# Patient Record
Sex: MALE | Race: ASIAN | NOT HISPANIC OR LATINO | Employment: STUDENT | URBAN - METROPOLITAN AREA
[De-identification: names, ages, dates, MRNs, and addresses within clinical notes are randomized per-mention and may not be internally consistent; named-entity substitution may affect disease eponyms.]

---

## 2017-08-14 ENCOUNTER — OFFICE VISIT (OUTPATIENT)
Dept: FAMILY MEDICINE | Facility: CLINIC | Age: 22
End: 2017-08-14
Payer: COMMERCIAL

## 2017-08-14 VITALS
TEMPERATURE: 97.9 F | HEIGHT: 68 IN | HEART RATE: 77 BPM | BODY MASS INDEX: 18.64 KG/M2 | SYSTOLIC BLOOD PRESSURE: 103 MMHG | WEIGHT: 123 LBS | DIASTOLIC BLOOD PRESSURE: 63 MMHG

## 2017-08-14 DIAGNOSIS — R76.8 HEPATITIS B CORE ANTIBODY POSITIVE: ICD-10-CM

## 2017-08-14 DIAGNOSIS — Z00.00 ENCOUNTER FOR ROUTINE ADULT HEALTH EXAMINATION WITHOUT ABNORMAL FINDINGS: Primary | ICD-10-CM

## 2017-08-14 DIAGNOSIS — R63.6 UNDERWEIGHT: ICD-10-CM

## 2017-08-14 LAB
ALBUMIN SERPL-MCNC: 4.1 G/DL (ref 3.4–5)
ALP SERPL-CCNC: 68 U/L (ref 65–260)
ALT SERPL W P-5'-P-CCNC: 25 U/L (ref 0–50)
ANION GAP SERPL CALCULATED.3IONS-SCNC: 3 MMOL/L (ref 3–14)
AST SERPL W P-5'-P-CCNC: 12 U/L (ref 0–35)
BILIRUB SERPL-MCNC: 0.6 MG/DL (ref 0.2–1.3)
BUN SERPL-MCNC: 17 MG/DL (ref 7–30)
CALCIUM SERPL-MCNC: 8.9 MG/DL (ref 8.5–10.1)
CHLORIDE SERPL-SCNC: 105 MMOL/L (ref 98–110)
CO2 SERPL-SCNC: 29 MMOL/L (ref 20–32)
CREAT SERPL-MCNC: 0.89 MG/DL (ref 0.5–1)
GFR SERPL CREATININE-BSD FRML MDRD: NORMAL ML/MIN/1.7M2
GLUCOSE SERPL-MCNC: 91 MG/DL (ref 70–99)
POTASSIUM SERPL-SCNC: 3.8 MMOL/L (ref 3.4–5.3)
PROT SERPL-MCNC: 7.4 G/DL (ref 6.8–8.8)
SODIUM SERPL-SCNC: 137 MMOL/L (ref 133–144)

## 2017-08-14 PROCEDURE — 99395 PREV VISIT EST AGE 18-39: CPT | Performed by: FAMILY MEDICINE

## 2017-08-14 PROCEDURE — 80053 COMPREHEN METABOLIC PANEL: CPT | Performed by: FAMILY MEDICINE

## 2017-08-14 PROCEDURE — 36415 COLL VENOUS BLD VENIPUNCTURE: CPT | Performed by: FAMILY MEDICINE

## 2017-08-14 NOTE — NURSING NOTE
"Chief Complaint   Patient presents with     Physical       Initial /63  Pulse 77  Temp 97.9  F (36.6  C) (Tympanic)  Ht 5' 7.75\" (1.721 m)  Wt 123 lb (55.8 kg)  BMI 18.84 kg/m2 Estimated body mass index is 18.84 kg/(m^2) as calculated from the following:    Height as of this encounter: 5' 7.75\" (1.721 m).    Weight as of this encounter: 123 lb (55.8 kg).  Medication Reconciliation: complete  Cristela Shah CMA    "

## 2017-08-14 NOTE — PROGRESS NOTES
SUBJECTIVE:   CC: Iggy Melgoza is an 21 year old male who presents for preventative health visit.     Healthy Habits:    Do you get at least three servings of calcium containing foods daily (dairy, green leafy vegetables, etc.)? yes    Amount of exercise or daily activities, outside of work: 5 day(s) per week, lifts weights    Problems taking medications regularly not applicable    Medication side effects: Na    Have you had an eye exam in the past two years? yes    Do you see a dentist twice per year? yes    Do you have sleep apnea, excessive snoring or daytime drowsiness?no      No other concerns    Mother asked about patient's result from [employer] having reactive HBc antibody but negative HBsAg.  Patient's birth mother was a hep B carrier.  Patient's immunization record showed he received Hep B immune globulin at birth, and two doses of hep B vaccine as an infant.    Today's PHQ-2 Score:   PHQ-2 ( 1999 Pfizer) 8/14/2017 6/17/2013   Q1: Little interest or pleasure in doing things 0 0   Q2: Feeling down, depressed or hopeless 0 0   PHQ-2 Score 0 0         Abuse: Current or Past(Physical, Sexual or Emotional)- No  Do you feel safe in your environment - Yes  Social History   Substance Use Topics     Smoking status: Never Smoker     Smokeless tobacco: Never Used     Alcohol use No     The patient does not drink >3 drinks per day nor >7 drinks per week.    Last PSA: No results found for: PSA    Reviewed orders with patient. Reviewed health maintenance and updated orders accordingly - Yes  BP Readings from Last 3 Encounters:   08/14/17 103/63   03/17/15 94/60   11/20/14 110/58    Wt Readings from Last 3 Encounters:   08/14/17 123 lb (55.8 kg) (5 %)*   03/17/15 125 lb (56.7 kg) (17 %)*   12/09/14 122 lb 8 oz (55.6 kg) (16 %)*     * Growth percentiles are based on CDC 2-20 Years data.                  Patient Active Problem List   Diagnosis     Nocturnal enuresis     Acne     Episodic mood disorder (H)     Past  "Surgical History:   Procedure Laterality Date     CLOSED REDUCTION NOSE  7/27/2011    Procedure:CLOSED REDUCTION NOSE; Reduction of Nasal Fracture; Surgeon:ANGELIQUE SENA; Location:[place] OR       Social History   Substance Use Topics     Smoking status: Never Smoker     Smokeless tobacco: Never Used     Alcohol use No     Family History   Problem Relation Age of Onset     Adopted: Yes     Unknown/Adopted Other          Current Outpatient Prescriptions   Medication Sig Dispense Refill     NO ACTIVE MEDICATIONS        No Known Allergies    Patient denies sexual activity.    Reviewed and updated as needed this visit by clinical staffTobacco  Allergies  Meds  Problems  Med Hx  Surg Hx  Fam Hx  Soc Hx          Reviewed and updated as needed this visit by Provider  Allergies  Meds  Problems        History reviewed. No pertinent past medical history.   Past Surgical History:   Procedure Laterality Date     CLOSED REDUCTION NOSE  7/27/2011    Procedure:CLOSED REDUCTION NOSE; Reduction of Nasal Fracture; Surgeon:ANGELIQUE SENA; Location:[place] OR       ROS:  C: NEGATIVE for fever, chills or significant change in weight  I: NEGATIVE for worrisome rashes, moles or lesions  E: NEGATIVE for vision changes or irritation  ENT: NEGATIVE for ear, mouth and throat problems  R: NEGATIVE for significant cough or SOB  CV: NEGATIVE for chest pain, palpitations or peripheral edema  GI: NEGATIVE for nausea, abdominal pain, heartburn, or change in bowel habits   male: negative for dysuria, hematuria, decreased urinary stream, erectile dysfunction, urethral discharge  M: NEGATIVE for significant arthralgias or myalgia  N: NEGATIVE for weakness, dizziness or paresthesias  E: NEGATIVE for temperature intolerance, skin/hair changes  H: NEGATIVE for bleeding problems  P: NEGATIVE for changes in mood or affect    OBJECTIVE:   /63  Pulse 77  Temp 97.9  F (36.6  C) (Tympanic)  Ht 5' 7.75\" (1.721 m)  Wt 123 lb (55.8 kg) "  BMI 18.84 kg/m2  EXAM:  GENERAL APPEARANCE: underweight based on BMI, thin but no muscle wasting,  alert and no distress  EYES: pink conj, no icterus, PERRL, EOMI  HENT: ear canals and TM's normal, nose and mouth without ulcers or lesions, oropharynx clear and oral mucous membranes moist  NECK: no adenopathy, no asymmetry, masses, or scars and thyroid normal to palpation  RESP: lungs clear to auscultation - no rales, rhonchi or wheezes  CV: regular rates and rhythm, normal S1 S2, no S3 or S4, no murmur, click or rub, no peripheral edema and peripheral pulses strong  ABDOMEN: soft, nontender, no hepatosplenomegaly, no masses and bowel sounds normal  MS: no musculoskeletal defects are noted and gait is age appropriate without ataxia  SKIN: no suspicious lesions or rashes  NEURO: Normal strength and tone, sensory exam grossly normal, mentation intact and speech normal    ASSESSMENT/PLAN:   Iggy was seen today for physical.    Diagnoses and all orders for this visit:    Encounter for routine adult health examination without abnormal findings  Patient was advised on recommended screening and preventive health recommendations.  He verbalized understanding and agreed to the plans below.    Underweight  No significant loss compared to previous weight. Patient has not reported loss of energy or other constitutional symptoms.  Discussed healthy ways to increase caloric intake.    Hepatitis B core antibody positive  -     Comprehensive metabolic panel  Discussed significance of the test.  Will confer with ID service in case this needs further eval.  Since this can suggest presence of undetectable antigen, this may be the reason that [place] prevented him from donating blood.  Patient concurred with testing liver enzymes today.      COUNSELING:  Reviewed preventive health counseling, as reflected in patient instructions       Regular exercise       Healthy diet/nutrition       Vision screening       Hearing screening     "   Safe sex practices/STD prevention     reports that he has never smoked. He has never used smokeless tobacco.      Estimated body mass index is 18.84 kg/(m^2) as calculated from the following:    Height as of this encounter: 5' 7.75\" (1.721 m).    Weight as of this encounter: 123 lb (55.8 kg).   Weight management plan noted, stable and monitoring    Counseling Resources:  ATP IV Guidelines  Pooled Cohorts Equation Calculator  FRAX Risk Assessment  ICSI Preventive Guidelines  Dietary Guidelines for Americans, 2010  USDA's MyPlate  ASA Prophylaxis  Lung CA Screening    Cornelius Angeles MD  [facility]  "

## 2017-08-14 NOTE — LETTER
Iggy Melgoza  [address]  [place] [address]        August 14, 2017          Dear Abad,    We are writing to inform you of your test results.  CMP showed normal liver enzyme levels.A copy of your result is enclosed.     If you have any questions or concerns, please call the clinic at the number listed above.       Sincerely,        Cornelius Angeles MD

## 2017-08-14 NOTE — MR AVS SNAPSHOT
After Visit Summary   8/14/2017    Iggy Melgoza    MRN: [id number]           Patient Information     Date Of Birth          1995        Visit Information        Provider Department      8/14/2017 12:40 PM Cornelius Angeles MD [facility]        Today's Diagnoses     Encounter for routine adult health examination without abnormal findings    -  1    Underweight        Hepatitis B core antibody positive          Care Instructions    Go to Clinic [facility] now for your blood draw.  You will be contacted in 1-2 days for results of your lab tests.    Increase caloric intake.  Good sources of nutrition are dairy, various fruits and vegetables, lean protein, fish.  Avoid junk food - not a good source of calories to gain weight.    Preventive Health Recommendations  Male Ages 18 - 25     Yearly exam:             See your health care provider every year in order to  o   Review health changes.   o   Discuss preventive care.    o   Review your medicines if your doctor has prescribed any.    You should be tested each year for STDs (sexually transmitted diseases).     Talk to your provider about cholesterol testing.      If you are at risk for diabetes, you should have a diabetes test (fasting glucose).    Shots: Get a flu shot each year. Get a tetanus shot every 10 years.     Nutrition:    Eat at least 5 servings of fruits and vegetables daily.     Eat whole-grain bread, whole-wheat pasta and brown rice instead of white grains and rice.     Talk to your provider about calcium and Vitamin D.     Lifestyle    Exercise for at least 150 minutes a week (30 minutes a day, 5 days a week). This will help you control your weight and prevent disease.     Limit alcohol to one drink per day.     No smoking.     Wear sunscreen to prevent skin cancer.     See your dentist every six months for an exam and cleaning.             Follow-ups after your visit        Follow-up notes from your care team      "Return if symptoms worsen or fail to improve.      Who to contact     If you have questions or need follow up information about today's clinic visit or your schedule please contact [facility] directly at [phone].  Normal or non-critical lab and imaging results will be communicated to you by MyChart, letter or phone within 4 business days after the clinic has received the results. If you do not hear from us within 7 days, please contact the clinic through MyChart or phone. If you have a critical or abnormal lab result, we will notify you by phone as soon as possible.  Submit refill requests through [employer] or call your pharmacy and they will forward the refill request to us. Please allow 3 business days for your refill to be completed.          Additional Information About Your Visit        [employer] Information     Bplats lets you send messages to your doctor, view your test results, renew your prescriptions, schedule appointments and more. To sign up, go to [email or web address] . Click on \"Log in\" on the left side of the screen, which will take you to the Welcome page. Then click on \"Sign up Now\" on the right side of the page.     You will be asked to enter the access code listed below, as well as some personal information. Please follow the directions to create your username and password.     Your access code is: [email or web address]  Expires: 2017  1:15 PM     Your access code will  in 90 days. If you need help or a new code, please call your [facility] clinic or [phone].        Care EveryWhere ID     This is your Care EveryWhere ID. This could be used by other organizations to access your [facility] medical records  [id number]        Your Vitals Were     Pulse Temperature Height BMI (Body Mass Index)          77 97.9  F (36.6  C) (Tympanic) 5' 7.75\" (1.721 m) 18.84 kg/m2         Blood Pressure from Last 3 Encounters:   17 103/63   03/17/15 94/60   14 110/58    Weight from " Last 3 Encounters:   08/14/17 123 lb (55.8 kg) (5 %)*   03/17/15 125 lb (56.7 kg) (17 %)*   12/09/14 122 lb 8 oz (55.6 kg) (16 %)*     * Growth percentiles are based on [facility] 2-20 Years data.              We Performed the Following     Comprehensive metabolic panel        Primary Care Provider Office Phone # Fax #    Cornelius Angeles [phone] [address] [facility] [address]        Equal Access to Services     [facility] : Hadii aad ku hadasho Soomaali, waaxda luqadaha, qaybta kaalmada adeegyalona, villa solis . So [facility] [phone].    ATENCIÓN: Si habla español, tiene a canales disposición servicios gratuitos de asistencia lingüística. Llame al [phone].    We comply with applicable federal civil rights laws and [place] laws. We do not discriminate on the basis of race, color, national origin, age, disability sex, sexual orientation or gender identity.            Thank you!     Thank you for choosing [facility]  for your care. Our goal is always to provide you with excellent care. Hearing back from our patients is one way we can continue to improve our services. Please take a few minutes to complete the written survey that you may receive in the mail after your visit with us. Thank you!             Your Updated Medication List - Protect others around you: Learn how to safely use, store and throw away your medicines at www.disposemymeds.org.          This list is accurate as of: 8/14/17  1:15 PM.  Always use your most recent med list.                   Brand Name Dispense Instructions for use Diagnosis    NO ACTIVE MEDICATIONS

## 2017-08-14 NOTE — PATIENT INSTRUCTIONS
Go to Clinic B now for your blood draw.  You will be contacted in 1-2 days for results of your lab tests.    Increase caloric intake.  Good sources of nutrition are dairy, various fruits and vegetables, lean protein, fish.  Avoid junk food - not a good source of calories to gain weight.    Preventive Health Recommendations  Male Ages 18 - 25     Yearly exam:             See your health care provider every year in order to  o   Review health changes.   o   Discuss preventive care.    o   Review your medicines if your doctor has prescribed any.    You should be tested each year for STDs (sexually transmitted diseases).     Talk to your provider about cholesterol testing.      If you are at risk for diabetes, you should have a diabetes test (fasting glucose).    Shots: Get a flu shot each year. Get a tetanus shot every 10 years.     Nutrition:    Eat at least 5 servings of fruits and vegetables daily.     Eat whole-grain bread, whole-wheat pasta and brown rice instead of white grains and rice.     Talk to your provider about calcium and Vitamin D.     Lifestyle    Exercise for at least 150 minutes a week (30 minutes a day, 5 days a week). This will help you control your weight and prevent disease.     Limit alcohol to one drink per day.     No smoking.     Wear sunscreen to prevent skin cancer.     See your dentist every six months for an exam and cleaning.

## 2022-05-21 ENCOUNTER — OFFICE VISIT (OUTPATIENT)
Dept: URGENT CARE | Facility: URGENT CARE | Age: 27
End: 2022-05-21
Payer: COMMERCIAL

## 2022-05-21 VITALS
DIASTOLIC BLOOD PRESSURE: 72 MMHG | WEIGHT: 135 LBS | BODY MASS INDEX: 20.68 KG/M2 | OXYGEN SATURATION: 98 % | TEMPERATURE: 98.4 F | RESPIRATION RATE: 16 BRPM | HEART RATE: 72 BPM | SYSTOLIC BLOOD PRESSURE: 110 MMHG

## 2022-05-21 DIAGNOSIS — J02.9 PHARYNGITIS, UNSPECIFIED ETIOLOGY: ICD-10-CM

## 2022-05-21 DIAGNOSIS — R07.0 THROAT PAIN: Primary | ICD-10-CM

## 2022-05-21 LAB — DEPRECATED S PYO AG THROAT QL EIA: NEGATIVE

## 2022-05-21 PROCEDURE — 99203 OFFICE O/P NEW LOW 30 MIN: CPT | Performed by: PHYSICIAN ASSISTANT

## 2022-05-21 PROCEDURE — 87651 STREP A DNA AMP PROBE: CPT | Performed by: PHYSICIAN ASSISTANT

## 2022-05-21 NOTE — PROGRESS NOTES
Throat pain  - Streptococcus A Rapid Screen w/Reflex to PCR - Clinic Collect  - Group A Streptococcus PCR Throat Swab    Pharyngitis, unspecified etiology  Age 12 months or more  Okay to use Zarbee's   Okay to use Rx Children Tylenol if prescribed (Dose based on weight)    Age 2-12:   Okay to use Children Motrin or Tylenol over the counter.    Adults:  Okay to take acetaminophen 500 mg- 2 tabs (Total of 1000 mg) every 8 hrs   Okay to take ibuprofen 200 mg- 3 tabs (Total of 600 mg) every 6 hours        Okay to use Neti pot for sinus lavage up to three times daily for congestion and sinus pressure if present. Daily hot shower can be beneficial for congestion and body aches. Okay to use bedroom vaporizer or humidifier if symptoms are worse at night. Nightly Vicks Vapor rub and 5-10 mg of Melatonin okay to use for sleep.     Over the counter cough medication and decongestants okay if not prescribed by me during this visit. For homeopathic alternatives to cough syrup and decongestant, feel free to try Elderberry extract.    Okay to use salt water gargles, warm tea (or warm water with lemon and honey), and lozenges for any throat discomfort. Chloraseptic spray is also highly encourages for throat pain/irritation.     Patient will need to get plenty of rest and drink at least 1.5-2 liters of fluids daily for adults and 1-1.5 liters for children. If vomiting and not tolerating liquids for more than 24 hrs, please go to your nearest emergency department for IV fluids and further treatment.     Patient is not contagious after 1 week from start of symptoms. If possible, wear mask for first 7 days. Wash hands regularly and vigorously for 30 seconds often.         JACLYN [facility]    Subjective   26 year old who presents to clinic today for the following health issues:    Chronic Cough       HPI     Acute Illness  Acute illness concerns: cough and sore throat and bloody noses    Onset/Duration: 1 week  Symptoms:  Fever: no  Chills/Sweats: no  Headache (location?): no  Sinus Pressure: no  Conjunctivitis:  no  Ear Pain: no  Rhinorrhea: no  Congestion: YES  Sore Throat: YES  Cough: YES  Wheeze: no  Decreased Appetite: no  Nausea: no  Vomiting: no  Diarrhea: no  Dysuria/Freq.: no  Dysuria or Hematuria: no  Fatigue/Achiness: no  Sick/Strep Exposure: Patient was on a flight a week ago.   Therapies tried and outcome: Cough drops and cough syrup + ibuprofen     Review of Systems   Review of Systems   See HPI     Objective    Temp: 98.4  F (36.9  C) Temp src: Temporal BP: 110/72 Pulse: 72   Resp: 16 SpO2: 98 %       Physical Exam   Physical Exam  Constitutional:       General: He is not in acute distress.     Appearance: Normal appearance. He is normal weight. He is not ill-appearing, toxic-appearing or diaphoretic.   HENT:      Head: Normocephalic and atraumatic.      Right Ear: Tympanic membrane, ear canal and external ear normal. There is no impacted cerumen.      Left Ear: Tympanic membrane, ear canal and external ear normal. There is no impacted cerumen.      Nose: Nose normal. No congestion or rhinorrhea.      Mouth/Throat:      Pharynx: Posterior oropharyngeal erythema present. No oropharyngeal exudate.   Cardiovascular:      Rate and Rhythm: Normal rate and regular rhythm.      Pulses: Normal pulses.      Heart sounds: Normal heart sounds. No murmur heard.    No friction rub. No gallop.   Pulmonary:      Effort: Pulmonary effort is normal. No respiratory distress.      Breath sounds: Normal breath sounds. No stridor. No wheezing, rhonchi or rales.   Chest:      Chest wall: No tenderness.   Neurological:      General: No focal deficit present.      Mental Status: He is alert and oriented to person, place, and time. Mental status is at baseline.      Gait: Gait normal.   Psychiatric:         Mood and Affect: Mood normal.         Behavior: Behavior normal.         Thought Content: Thought  content normal.         Judgment: Judgment normal.          Results for orders placed or performed in visit on 05/21/22 (from the past 24 hour(s))   Streptococcus A Rapid Screen w/Reflex to PCR - Clinic Collect    Specimen: Throat; Swab   Result Value Ref Range    Group A Strep antigen Negative Negative

## 2022-05-22 LAB — GROUP A STREP BY PCR: NOT DETECTED

## 2022-06-07 ASSESSMENT — ENCOUNTER SYMPTOMS
DYSURIA: 0
EYE PAIN: 0
PALPITATIONS: 0
ARTHRALGIAS: 0
NAUSEA: 0
HEMATURIA: 0
JOINT SWELLING: 0
SORE THROAT: 0
DIZZINESS: 0
WEAKNESS: 0
CONSTIPATION: 0
HEADACHES: 0
FREQUENCY: 0
CHILLS: 0
PARESTHESIAS: 0
HEMATOCHEZIA: 0
HEARTBURN: 0
MYALGIAS: 0
SHORTNESS OF BREATH: 0
FEVER: 0
NERVOUS/ANXIOUS: 0
DIARRHEA: 0
ABDOMINAL PAIN: 0
COUGH: 0

## 2022-06-08 ENCOUNTER — OFFICE VISIT (OUTPATIENT)
Dept: INTERNAL MEDICINE | Facility: CLINIC | Age: 27
End: 2022-06-08
Payer: COMMERCIAL

## 2022-06-08 VITALS
TEMPERATURE: 97.8 F | OXYGEN SATURATION: 97 % | SYSTOLIC BLOOD PRESSURE: 100 MMHG | DIASTOLIC BLOOD PRESSURE: 64 MMHG | RESPIRATION RATE: 14 BRPM | HEART RATE: 93 BPM | WEIGHT: 132.6 LBS | BODY MASS INDEX: 20.1 KG/M2 | HEIGHT: 68 IN

## 2022-06-08 DIAGNOSIS — Z11.59 NEED FOR HEPATITIS C SCREENING TEST: ICD-10-CM

## 2022-06-08 DIAGNOSIS — Z13.6 CARDIOVASCULAR SCREENING; LDL GOAL LESS THAN 160: ICD-10-CM

## 2022-06-08 DIAGNOSIS — Z00.00 ENCOUNTER FOR ROUTINE ADULT HEALTH EXAMINATION WITHOUT ABNORMAL FINDINGS: Primary | ICD-10-CM

## 2022-06-08 DIAGNOSIS — Z11.4 SCREENING FOR HIV (HUMAN IMMUNODEFICIENCY VIRUS): ICD-10-CM

## 2022-06-08 PROCEDURE — 90715 TDAP VACCINE 7 YRS/> IM: CPT | Performed by: INTERNAL MEDICINE

## 2022-06-08 PROCEDURE — 99395 PREV VISIT EST AGE 18-39: CPT | Mod: 25 | Performed by: INTERNAL MEDICINE

## 2022-06-08 PROCEDURE — 90471 IMMUNIZATION ADMIN: CPT | Performed by: INTERNAL MEDICINE

## 2022-06-08 RX ORDER — MULTIPLE VITAMINS W/ MINERALS TAB 9MG-400MCG
1 TAB ORAL DAILY
COMMUNITY

## 2022-06-08 ASSESSMENT — ENCOUNTER SYMPTOMS
PARESTHESIAS: 0
MYALGIAS: 0
JOINT SWELLING: 0
HEADACHES: 0
NERVOUS/ANXIOUS: 0
HEMATURIA: 0
HEMATOCHEZIA: 0
SHORTNESS OF BREATH: 0
SORE THROAT: 0
PALPITATIONS: 0
EYE PAIN: 0
CONSTIPATION: 0
WEAKNESS: 0
HEARTBURN: 0
DIZZINESS: 0
COUGH: 0
ABDOMINAL PAIN: 0
ARTHRALGIAS: 0
DIARRHEA: 0
NAUSEA: 0
CHILLS: 0
FEVER: 0
FREQUENCY: 0
DYSURIA: 0

## 2022-06-08 NOTE — PROGRESS NOTES
Assessment & Plan     Encounter for routine adult health examination without abnormal findings  Advised patient to update tetanus booster and check on prior status of HPV vaccine.   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  No restrictions to full activity and training are noted.  - CBC with platelets; Future  - Comprehensive metabolic panel; Future    CARDIOVASCULAR SCREENING; LDL GOAL LESS THAN 160  Labs ordered as fasting per routine  - Lipid Profile; Future    Screening for HIV (human immunodeficiency virus)  Ordered as routine screening  - HIV Antigen Antibody Combo; Future    Need for hepatitis C screening test  Ordered as routine screening  - Hepatitis C Screen Reflex to HCV RNA Quant and Genotype; Future       Regular exercise    Return in about 1 year (around 6/8/2023) for Physical Exam.    Vineet Stevens MD  [facility] Holden is a 26 year old who presents for the following health issues  accompanied by his self.      Patient has never seen me prior and has been followed at the family practice clinic.  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    Denies any major complaints.  His review of systems is essentially negative.  He reports no allergies.  He takes no prescription based medication.  He reports no distinct issues of mental health or concern thereof.    He is due for a tetanus booster.  He is unclear if he has had an HPV vaccine but will check.    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   HENT: Negative for congestion, ear pain, hearing loss and sore throat.    Eyes: Negative for pain and visual disturbance.   Respiratory: Negative for cough and shortness of breath.    Cardiovascular: Negative for chest pain, palpitations and peripheral edema.   Gastrointestinal: Negative for abdominal pain, constipation, diarrhea, heartburn, hematochezia and nausea.   Genitourinary: Negative for dysuria, frequency, genital sores, hematuria, impotence, penile discharge and urgency.   Musculoskeletal: Negative for arthralgias, joint swelling and myalgias.   Skin: Negative for rash.   Neurological: Negative for dizziness, weakness, headaches and paresthesias.   Psychiatric/Behavioral: Negative for mood changes. The patient is not nervous/anxious.       Current Outpatient Medications   Medication     multivitamin w/minerals (MULTI-VITAMIN) tablet     NO ACTIVE MEDICATIONS     PROTEIN PO     No current facility-administered medications for this visit.           Objective    /64   Pulse 93   Temp 97.8  F (36.6  C) (Temporal)   Resp 14   Ht 1.715 m (5' 7.5\")   Wt 60.1 kg (132 lb 9.6 oz)   SpO2 97%   BMI 20.46 kg/m    Body mass index is 20.46 kg/m .  Physical Exam   GENERAL: alert and no distress  EYES: Eyes grossly normal to inspection, PERRL and conjunctivae and sclerae normal  Visual acuity exam performed within the office demonstrates visual acuity OD 20/25, OS 20/25, OU 20/25  HENT: ear canals and TM's normal, nose and mouth without ulcers or lesions  NECK: no adenopathy, no asymmetry, masses, or scars and thyroid normal to palpation  RESP: lungs clear to auscultation - no rales, " rhonchi or wheezes  CV: regular rate and rhythm, normal S1 S2, no S3 or S4, no murmur, click or rub, no peripheral edema and peripheral pulses strong  Genitalia exam normal, no hernia noted.  Testicles are downgoing bilaterally with no obvious masses or tenderness noted  NEURO: No focal changes  PSYCH: mentation appears normal, affect normal/bright              Answers for HPI/ROS submitted by the patient on 6/7/2022  Frequency of exercise:: 2-3 days/week  Getting at least 3 servings of Calcium per day:: Yes  Diet:: Vegetarian/vegan  Taking medications regularly:: Yes  Medication side effects:: Not applicable, None  Bi-annual eye exam:: Yes  Dental care twice a year:: NO  Sleep apnea or symptoms of sleep apnea:: None  abdominal pain: No  Blood in stool: No  Blood in urine: No  chest pain: No  chills: No  congestion: No  constipation: No  cough: No  diarrhea: No  dizziness: No  ear pain: No  eye pain: No  nervous/anxious: No  fever: No  frequency: No  genital sores: No  headaches: No  hearing loss: No  heartburn: No  arthralgias: No  joint swelling: No  peripheral edema: No  mood changes: No  myalgias: No  nausea: No  dysuria: No  palpitations: No  Skin sensation changes: No  sore throat: No  urgency: No  rash: No  shortness of breath: No  visual disturbance: No  weakness: No  impotence: No  penile discharge: No  Additional concerns today:: Yes  Duration of exercise:: Greater than 60 minutes

## 2022-11-20 ENCOUNTER — HEALTH MAINTENANCE LETTER (OUTPATIENT)
Age: 27
End: 2022-11-20

## 2023-05-09 ENCOUNTER — PATIENT OUTREACH (OUTPATIENT)
Dept: CARE COORDINATION | Facility: CLINIC | Age: 28
End: 2023-05-09
Payer: COMMERCIAL

## 2023-05-23 ENCOUNTER — PATIENT OUTREACH (OUTPATIENT)
Dept: CARE COORDINATION | Facility: CLINIC | Age: 28
End: 2023-05-23
Payer: COMMERCIAL

## 2023-07-08 ENCOUNTER — HEALTH MAINTENANCE LETTER (OUTPATIENT)
Age: 28
End: 2023-07-08

## 2024-08-31 ENCOUNTER — HEALTH MAINTENANCE LETTER (OUTPATIENT)
Age: 29
End: 2024-08-31